# Patient Record
Sex: MALE | Race: WHITE | Employment: UNEMPLOYED | URBAN - METROPOLITAN AREA
[De-identification: names, ages, dates, MRNs, and addresses within clinical notes are randomized per-mention and may not be internally consistent; named-entity substitution may affect disease eponyms.]

---
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
## 2025-01-09 ENCOUNTER — OFFICE VISIT (OUTPATIENT)
Dept: FAMILY MEDICINE | Facility: CLINIC | Age: 20
End: 2025-01-09
Payer: COMMERCIAL

## 2025-01-09 VITALS
BODY MASS INDEX: 26.4 KG/M2 | DIASTOLIC BLOOD PRESSURE: 82 MMHG | HEART RATE: 73 BPM | WEIGHT: 188.6 LBS | RESPIRATION RATE: 18 BRPM | TEMPERATURE: 97.9 F | SYSTOLIC BLOOD PRESSURE: 122 MMHG | OXYGEN SATURATION: 100 % | HEIGHT: 71 IN

## 2025-01-09 DIAGNOSIS — Z00.00 ROUTINE GENERAL MEDICAL EXAMINATION AT A HEALTH CARE FACILITY: Primary | ICD-10-CM

## 2025-01-09 DIAGNOSIS — F98.8 ATTENTION DEFICIT DISORDER, UNSPECIFIED TYPE: ICD-10-CM

## 2025-01-09 DIAGNOSIS — Z13.1 SCREENING FOR DIABETES MELLITUS: ICD-10-CM

## 2025-01-09 DIAGNOSIS — Z13.220 SCREENING FOR HYPERLIPIDEMIA: ICD-10-CM

## 2025-01-09 LAB
BASOPHILS # BLD AUTO: 0.1 10E3/UL (ref 0–0.2)
BASOPHILS NFR BLD AUTO: 1 %
EOSINOPHIL # BLD AUTO: 0.2 10E3/UL (ref 0–0.7)
EOSINOPHIL NFR BLD AUTO: 2 %
ERYTHROCYTE [DISTWIDTH] IN BLOOD BY AUTOMATED COUNT: 12.5 % (ref 10–15)
EST. AVERAGE GLUCOSE BLD GHB EST-MCNC: 105 MG/DL
HBA1C MFR BLD: 5.3 % (ref 0–5.6)
HCT VFR BLD AUTO: 44.7 % (ref 40–53)
HGB BLD-MCNC: 14.9 G/DL (ref 13.3–17.7)
IMM GRANULOCYTES # BLD: 0 10E3/UL
IMM GRANULOCYTES NFR BLD: 0 %
LYMPHOCYTES # BLD AUTO: 1.9 10E3/UL (ref 0.8–5.3)
LYMPHOCYTES NFR BLD AUTO: 24 %
MCH RBC QN AUTO: 29.4 PG (ref 26.5–33)
MCHC RBC AUTO-ENTMCNC: 33.3 G/DL (ref 31.5–36.5)
MCV RBC AUTO: 88 FL (ref 78–100)
MONOCYTES # BLD AUTO: 0.8 10E3/UL (ref 0–1.3)
MONOCYTES NFR BLD AUTO: 10 %
NEUTROPHILS # BLD AUTO: 4.9 10E3/UL (ref 1.6–8.3)
NEUTROPHILS NFR BLD AUTO: 63 %
PLATELET # BLD AUTO: 212 10E3/UL (ref 150–450)
RBC # BLD AUTO: 5.06 10E6/UL (ref 4.4–5.9)
WBC # BLD AUTO: 7.9 10E3/UL (ref 4–11)

## 2025-01-09 RX ORDER — BUPROPION HYDROCHLORIDE 300 MG/1
300 TABLET ORAL EVERY MORNING
Qty: 90 TABLET | Refills: 0 | Status: SHIPPED | OUTPATIENT
Start: 2025-01-09

## 2025-01-09 SDOH — HEALTH STABILITY: PHYSICAL HEALTH: ON AVERAGE, HOW MANY DAYS PER WEEK DO YOU ENGAGE IN MODERATE TO STRENUOUS EXERCISE (LIKE A BRISK WALK)?: 1 DAY

## 2025-01-09 ASSESSMENT — SOCIAL DETERMINANTS OF HEALTH (SDOH): HOW OFTEN DO YOU GET TOGETHER WITH FRIENDS OR RELATIVES?: ONCE A WEEK

## 2025-01-09 NOTE — PROGRESS NOTES
Preventive Care Visit  [facility] FRANTZ Kamara MD, Internal Medicine  Jan 9, 2025      Assessment & Plan     Routine general medical examination at a health care facility  We discussed about diet/exercise  He had constipation issues in the past but he no longer has them  Discussed about vaccinations  He is up-to-date all the vaccines except meningitis B vaccine and COVID booster  Has a family history of diabetes  No family history of any colon cancer or prostate cancer  - Basic metabolic panel  (Ca, Cl, CO2, Creat, Gluc, K, Na, BUN); Future  - CBC with platelets and differential; Future  - Basic metabolic panel  (Ca, Cl, CO2, Creat, Gluc, K, Na, BUN)  - CBC with platelets and differential    Attention deficit disorder, unspecified type  He has issues with some ADD  He also has social anxiety issues  He does procrastinate a lot  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  He did have same procrastination issues in school  He does admit that he has issues with concentration focusing even when he switches of his electronics  He has been started on Wellbutrin  mg for his social anxiety and today they were wondering about treatment options for ADD  We discussed that the first thing will be to get him tested for ADD and if the formal testing does not show that he has got ADHD then we can look into treatment options with stimulants  For now I am happy to prescribe nonstimulants to see if that helps  Discussed about Strattera versus increasing the dose of Wellbutrin to 300 mg  They want to first increase the dose of Wellbutrin to 300 MG to see how it goes  We discussed about the side effects of Wellbutrin  - buPROPion (WELLBUTRIN XL) 300 MG 24 hr tablet; Take 1 tablet (300 mg) by mouth every morning.  - Adult Mental Health  Referral; Future    Screening for diabetes mellitus    - Hemoglobin A1c; Future  - Hemoglobin A1c    Screening for hyperlipidemia    - Lipid panel  "reflex to direct LDL Fasting; Future  - Lipid panel reflex to direct LDL Fasting    Patient has been advised of split billing requirements and indicates understanding: No        BMI  Estimated body mass index is 26.68 kg/m  as calculated from the following:    Height as of this encounter: 1.791 m (5' 10.5\").    Weight as of this encounter: 85.5 kg (188 lb 9.6 oz).   Weight management plan: Discussed healthy diet and exercise guidelines    Counseling  Appropriate preventive services were addressed with this patient via screening, questionnaire, or discussion as appropriate for fall prevention, nutrition, physical activity, Tobacco-use cessation, social engagement, weight loss and cognition.  Checklist reviewing preventive services available has been given to the patient.  Reviewed patient's diet, addressing concerns and/or questions.   He is at risk for lack of exercise and has been provided with information to increase physical activity for the benefit of his well-being.   He is at risk for psychosocial distress and has been provided with information to reduce risk.           Xavi Day is a 19 year old, presenting for the following:  Establish Care and Physical        1/9/2025     1:52 PM   Additional Questions   Roomed by Edd RIZZO   Accompanied by Elaine Powell (Mother)         1/9/2025     1:52 PM   Patient Reported Additional Medications   Patient reports taking the following new medications Wellbutrin          HPI  Here for annual physical        Health Care Directive  Patient does not have a Health Care Directive: Discussed advance care planning with patient; however, patient declined at this time.      1/9/2025   General Health   How would you rate your overall physical health? Good   Feel stress (tense, anxious, or unable to sleep) To some extent   (!) STRESS CONCERN      1/9/2025   Nutrition   Three or more servings of calcium each day? Yes   Diet: Regular (no restrictions)   How many servings of " fruit and vegetables per day? (!) 0-1   How many sweetened beverages each day? 0-1         1/9/2025   Exercise   Days per week of moderate/strenous exercise 1 day   (!) EXERCISE CONCERN      1/9/2025   Social Factors   Frequency of gathering with friends or relatives Once a week   Worry food won't last until get money to buy more No   Food not last or not have enough money for food? No   Do you have housing? (Housing is defined as stable permanent housing and does not include staying ouside in a car, in a tent, in an abandoned building, in an overnight shelter, or couch-surfing.) No   Are you worried about losing your housing? No   Lack of transportation? No   Unable to get utilities (heat,electricity)? No   Want help with housing or utility concern? No   (!) HOUSING CONCERN PRESENT      1/9/2025   Dental   Dentist two times every year? Yes         1/9/2025   TB Screening   Were you born outside of the [place]? No         Today's PHQ-2 Score:       1/9/2025     1:44 PM   PHQ-2 ( 1999 Pf[employer])   Q1: Little interest or pleasure in doing things 1   Q2: Feeling down, depressed or hopeless 1   PHQ-2 Score 2    Q1: Little interest or pleasure in doing things Several days   Q2: Feeling down, depressed or hopeless Several days   PHQ-2 Score 2       Patient-reported           1/9/2025   Substance Use   Alcohol more than 3/day or more than 7/wk No   Do you use any other substances recreationally? No     Social History     Tobacco Use    Smoking status: Never    Smokeless tobacco: Never             1/9/2025   One time HIV Screening   Previous HIV test? No         1/9/2025   STI Screening   New sexual partner(s) since last STI/HIV test? No         1/9/2025   Contraception/Family Planning   Questions about contraception or family planning No        Reviewed and updated as needed this visit by Provider                          Review of Systems  Constitutional, HEENT, cardiovascular, pulmonary, gi and gu systems are negative, except as  "otherwise noted.     Objective    Exam  /82 (BP Location: Left arm, Patient Position: Sitting, Cuff Size: Adult Large)   Pulse 73   Temp 97.9  F (36.6  C) (Temporal)   Resp 18   Ht 1.791 m (5' 10.5\")   Wt 85.5 kg (188 lb 9.6 oz)   SpO2 100%   BMI 26.68 kg/m     Estimated body mass index is 26.68 kg/m  as calculated from the following:    Height as of this encounter: 1.791 m (5' 10.5\").    Weight as of this encounter: 85.5 kg (188 lb 9.6 oz).    Physical Exam  GENERAL: alert and no distress  EYES: Eyes grossly normal to inspection, PERRL and conjunctivae and sclerae normal  HENT: ear canals and TM's normal, nose and mouth without ulcers or lesions  NECK: no adenopathy, no asymmetry, masses, or scars  RESP: lungs clear to auscultation - no rales, rhonchi or wheezes  CV: regular rate and rhythm, normal S1 S2, no S3 or S4, no murmur, click or rub, no peripheral edema  ABDOMEN: soft, nontender, no hepatosplenomegaly, no masses and bowel sounds normal  MS: no gross musculoskeletal defects noted, no edema  SKIN: no suspicious lesions or rashes  NEURO: Normal strength and tone, mentation intact and speech normal  PSYCH: mentation appears normal, affect normal/bright        Vision Screen       Hearing Screen           Signed Electronically by: Rajeev Kamara MD    "

## 2025-02-04 ENCOUNTER — MYC MEDICAL ADVICE (OUTPATIENT)
Dept: FAMILY MEDICINE | Facility: CLINIC | Age: 20
End: 2025-02-04
Payer: COMMERCIAL

## 2025-02-04 NOTE — TELEPHONE ENCOUNTER
This RN attempted to reach patient and mother (CtC on file 1/9/25) on 2/4/25.  Left message to return call.      If they call back:    Please triage for worsening anxiety.  See mychart message.      Last OV 1/9/25.  Provider increased Wellbutrin to 300 mg on that visit.  Did discuss alternatives during that visit of Strattera.  Mental Health  referral placed at that time too.         Kristina Kjellberg, MSN, RN

## 2025-02-04 NOTE — TELEPHONE ENCOUNTER
Patient returning clinic's call. Patient recently increased his Wellbutrin from 150 mg to 300 mg. Patient endorses worsening anxiety symptoms. Please see [employer] message.  Denies suicidal ideation. He reports his procrastination and concentration have not improved. Patient is scheduled for Psych in May.     Routing to provider. Patient wondering about other treatment options/ recommendations until testing in May.       Routing to provider to review/advise.     Patient would like a [employer] message back with provider's response.     Joan CHANN,  RN  [facility]

## 2025-02-10 ENCOUNTER — VIRTUAL VISIT (OUTPATIENT)
Dept: FAMILY MEDICINE | Facility: CLINIC | Age: 20
End: 2025-02-10
Payer: COMMERCIAL

## 2025-02-10 DIAGNOSIS — F41.1 GAD (GENERALIZED ANXIETY DISORDER): Primary | ICD-10-CM

## 2025-02-10 DIAGNOSIS — F33.0 MILD EPISODE OF RECURRENT MAJOR DEPRESSIVE DISORDER: ICD-10-CM

## 2025-02-10 DIAGNOSIS — F98.8 ATTENTION DEFICIT DISORDER, UNSPECIFIED TYPE: ICD-10-CM

## 2025-02-10 PROCEDURE — 98006 SYNCH AUDIO-VIDEO EST MOD 30: CPT | Performed by: INTERNAL MEDICINE

## 2025-02-10 RX ORDER — BUPROPION HYDROCHLORIDE 150 MG/1
300 TABLET ORAL EVERY MORNING
Status: SHIPPED
Start: 2025-02-10

## 2025-02-10 ASSESSMENT — ANXIETY QUESTIONNAIRES
8. IF YOU CHECKED OFF ANY PROBLEMS, HOW DIFFICULT HAVE THESE MADE IT FOR YOU TO DO YOUR WORK, TAKE CARE OF THINGS AT HOME, OR GET ALONG WITH OTHER PEOPLE?: VERY DIFFICULT
2. NOT BEING ABLE TO STOP OR CONTROL WORRYING: SEVERAL DAYS
4. TROUBLE RELAXING: MORE THAN HALF THE DAYS
GAD7 TOTAL SCORE: 7
7. FEELING AFRAID AS IF SOMETHING AWFUL MIGHT HAPPEN: NOT AT ALL
GAD7 TOTAL SCORE: 7
7. FEELING AFRAID AS IF SOMETHING AWFUL MIGHT HAPPEN: NOT AT ALL
5. BEING SO RESTLESS THAT IT IS HARD TO SIT STILL: NOT AT ALL
IF YOU CHECKED OFF ANY PROBLEMS ON THIS QUESTIONNAIRE, HOW DIFFICULT HAVE THESE PROBLEMS MADE IT FOR YOU TO DO YOUR WORK, TAKE CARE OF THINGS AT HOME, OR GET ALONG WITH OTHER PEOPLE: VERY DIFFICULT
GAD7 TOTAL SCORE: 7
3. WORRYING TOO MUCH ABOUT DIFFERENT THINGS: SEVERAL DAYS
6. BECOMING EASILY ANNOYED OR IRRITABLE: SEVERAL DAYS
1. FEELING NERVOUS, ANXIOUS, OR ON EDGE: MORE THAN HALF THE DAYS

## 2025-02-10 NOTE — PROGRESS NOTES
"  If patient has telephone visit, have they been educated on video visit as preferred visit method and offered to change to video visit? N/A        Instructions Relayed to Patient by Virtual Roomer:     Patient is active on [email or web address]:   Relayed following to patient: \"It looks like you are active on [email or web address], are you able to join the visit this way? If not, do you need us to send you a link now or would you like your provider to send a link via text or email when they are ready to initiate the visit?\"      Patient Confirmed they will join visit via: Email   Reminded patient to ensure they were logged on to virtual visit by arrival time listed.   Asked if patient has flexibility to initiate visit sooner than arrival time: patient is unable to initiate visit earlier than arrival time     If pediatric virtual visit, ensured pediatric patient along with parent/guardian will be present for video visit.     Patient offered the website [email or web address] and/or phone number to [email or web address] Help line: [phone]      Shay is a 20 year old who is being evaluated via a billable video visit.    How would you like to obtain your AVS? [employer]  If the video visit is dropped, the invitation should be resent by: Send to e-mail at: [email or web address]  Will anyone else be joining your video visit? No      Assessment & Plan     Attention deficit disorder, unspecified type  He has some ADHD symptoms  We discussed about this during last visit  He is going to see a psychologist in May for formal assessment  When I saw him last month he was on Wellbutrin  mg for his social anxiety disorder and we increase the dose to 300 mg as we thought this will help with his ADHD  However since increasing the dose he tells me that his anxiety is worse  His depression have also gotten worse  Today I will decrease the dose back to 150 mg XL  Alternate options will be explored to treat his anxiety and depression  - buPROPion " (WELLBUTRIN XL) 150 MG 24 hr tablet; Take 2 tablets (300 mg) by mouth every morning.    CE (generalized anxiety disorder)  CE score is 7 and he tells me his anxiety has gotten worse after increasing the dose of Wellbutrin XL to 300 mg  We discussed about SSRIs like Lexapro/Prozac/Zoloft/Cytomel plan  Also discussed about options like gabapentin and BuSpar  Also discussed about options which can be used as needed like hydroxyzine  He is going to look into these options  I explained to him that if  we select SSRI it will work for both anxiety and depression and we can slowly taper of the Wellbutrin XL unless he wants to continue to take this to counteract any sexual side effects of SSRIs  He is going to get back in touch with me about his choices    Mild episode of recurrent major depressive disorder  Between anxiety and depression his anxiety is worse  I told him that if we select SSRI that will also help with his depression  He is scheduled for counseling  No thoughts of self-harm                Xavi Day is a 20 year old, presenting for the following health issues:  Recheck Medication (Wellbutrin)      2/10/2025     1:02 PM   Additional Questions   Roomed by Asha OSBORN   Accompanied by Self     History of Present Illness       Mental Health Follow-up:  Patient presents to follow-up on Depression & Anxiety.Patient's depression since last visit has been:  Worse  The patient is having other symptoms associated with depression.  Patient's anxiety since last visit has been:  Worse  The patient is having other symptoms associated with anxiety.  Any significant life events: No  Patient is feeling anxious or having panic attacks.  Patient has no concerns about alcohol or drug use.    He eats 2-3 servings of fruits and vegetables daily.He consumes 1 sweetened beverage(s) daily.He exercises with enough effort to increase his heart rate 9 or less minutes per day.  He exercises with enough effort to increase his heart  rate 3 or less days per week.   He is taking medications regularly.               Review of Systems  Constitutional, HEENT, cardiovascular, pulmonary, gi and gu systems are negative, except as otherwise noted.      Objective           Vitals:  No vitals were obtained today due to virtual visit.    Physical Exam   GENERAL: alert and no distress  EYES: Eyes grossly normal to inspection.  No discharge or erythema, or obvious scleral/conjunctival abnormalities.  RESP: No audible wheeze, cough, or visible cyanosis.    SKIN: Visible skin clear. No significant rash, abnormal pigmentation or lesions.  NEURO: Cranial nerves grossly intact.  Mentation and speech appropriate for age.  PSYCH: Appropriate affect, tone, and pace of words          Video-Visit Details    Type of service:  Video Visit   Originating Location (pt. Location): Home    Distant Location (provider location):  Off-site  Platform used for Video Visit: [facility]  Signed Electronically by: Rajeev Kamara MD

## 2025-02-16 ENCOUNTER — MYC MEDICAL ADVICE (OUTPATIENT)
Dept: FAMILY MEDICINE | Facility: CLINIC | Age: 20
End: 2025-02-16
Payer: COMMERCIAL

## 2025-02-16 DIAGNOSIS — F33.0 MILD EPISODE OF RECURRENT MAJOR DEPRESSIVE DISORDER: ICD-10-CM

## 2025-02-16 DIAGNOSIS — F41.1 GAD (GENERALIZED ANXIETY DISORDER): Primary | ICD-10-CM

## 2025-02-17 RX ORDER — ESCITALOPRAM OXALATE 10 MG/1
10 TABLET ORAL DAILY
Qty: 90 TABLET | Refills: 0 | Status: SHIPPED | OUTPATIENT
Start: 2025-02-17

## 2025-02-17 RX ORDER — HYDROXYZINE HYDROCHLORIDE 25 MG/1
25-50 TABLET, FILM COATED ORAL 3 TIMES DAILY PRN
Qty: 270 TABLET | Refills: 0 | Status: SHIPPED | OUTPATIENT
Start: 2025-02-17

## 2025-03-07 ENCOUNTER — MYC REFILL (OUTPATIENT)
Dept: FAMILY MEDICINE | Facility: CLINIC | Age: 20
End: 2025-03-07
Payer: COMMERCIAL

## 2025-03-07 DIAGNOSIS — F98.8 ATTENTION DEFICIT DISORDER, UNSPECIFIED TYPE: ICD-10-CM

## 2025-03-07 NOTE — TELEPHONE ENCOUNTER
Clinic RN: Please investigate patient's chart or contact patient if the information cannot be found because  please pend desired pharmacy. Last script put in as no print out, please route to provider for review.     DUSTIN BergmanN, RN

## 2025-03-07 NOTE — TELEPHONE ENCOUNTER
Pharmacy pended, asking if patient is on 150 mg or 300 mg daily. Per MyC 2/16, patient may only be taking 150 mg daily.    Selina Cuevas, RN, BSN  [facility]  [place], [place] and [place]

## 2025-03-10 RX ORDER — BUPROPION HYDROCHLORIDE 150 MG/1
150 TABLET ORAL EVERY MORNING
Qty: 90 TABLET | Refills: 1 | Status: SHIPPED | OUTPATIENT
Start: 2025-03-10

## 2025-03-10 NOTE — TELEPHONE ENCOUNTER
Routing to provider to advise on refill request - patient only take 150 mg daily, dose pended      Selina Cuevas, RN, BSN  [facility]  [place], [place] and [place]

## 2025-04-15 ENCOUNTER — MYC MEDICAL ADVICE (OUTPATIENT)
Dept: FAMILY MEDICINE | Facility: CLINIC | Age: 20
End: 2025-04-15
Payer: COMMERCIAL

## 2025-04-15 DIAGNOSIS — R53.83 TIRED: Primary | ICD-10-CM

## 2025-05-12 ENCOUNTER — VIRTUAL VISIT (OUTPATIENT)
Facility: CLINIC | Age: 20
End: 2025-05-12
Attending: INTERNAL MEDICINE
Payer: COMMERCIAL

## 2025-05-12 DIAGNOSIS — Z13.39 ADHD (ATTENTION DEFICIT HYPERACTIVITY DISORDER) EVALUATION: ICD-10-CM

## 2025-05-12 DIAGNOSIS — F41.1 GENERALIZED ANXIETY DISORDER: Primary | ICD-10-CM

## 2025-05-12 DIAGNOSIS — F32.0 MAJOR DEPRESSIVE DISORDER, SINGLE EPISODE, MILD: ICD-10-CM

## 2025-05-12 PROCEDURE — 90832 PSYTX W PT 30 MINUTES: CPT | Mod: 95

## 2025-05-12 ASSESSMENT — ANXIETY QUESTIONNAIRES
GAD7 TOTAL SCORE: 8
3. WORRYING TOO MUCH ABOUT DIFFERENT THINGS: SEVERAL DAYS
GAD7 TOTAL SCORE: 8
2. NOT BEING ABLE TO STOP OR CONTROL WORRYING: SEVERAL DAYS
5. BEING SO RESTLESS THAT IT IS HARD TO SIT STILL: SEVERAL DAYS
7. FEELING AFRAID AS IF SOMETHING AWFUL MIGHT HAPPEN: NOT AT ALL
GAD7 TOTAL SCORE: 8
4. TROUBLE RELAXING: MORE THAN HALF THE DAYS
8. IF YOU CHECKED OFF ANY PROBLEMS, HOW DIFFICULT HAVE THESE MADE IT FOR YOU TO DO YOUR WORK, TAKE CARE OF THINGS AT HOME, OR GET ALONG WITH OTHER PEOPLE?: SOMEWHAT DIFFICULT
6. BECOMING EASILY ANNOYED OR IRRITABLE: SEVERAL DAYS
7. FEELING AFRAID AS IF SOMETHING AWFUL MIGHT HAPPEN: NOT AT ALL
1. FEELING NERVOUS, ANXIOUS, OR ON EDGE: MORE THAN HALF THE DAYS
IF YOU CHECKED OFF ANY PROBLEMS ON THIS QUESTIONNAIRE, HOW DIFFICULT HAVE THESE PROBLEMS MADE IT FOR YOU TO DO YOUR WORK, TAKE CARE OF THINGS AT HOME, OR GET ALONG WITH OTHER PEOPLE: SOMEWHAT DIFFICULT

## 2025-05-12 ASSESSMENT — PATIENT HEALTH QUESTIONNAIRE - PHQ9
SUM OF ALL RESPONSES TO PHQ QUESTIONS 1-9: 9
10. IF YOU CHECKED OFF ANY PROBLEMS, HOW DIFFICULT HAVE THESE PROBLEMS MADE IT FOR YOU TO DO YOUR WORK, TAKE CARE OF THINGS AT HOME, OR GET ALONG WITH OTHER PEOPLE: SOMEWHAT DIFFICULT
SUM OF ALL RESPONSES TO PHQ QUESTIONS 1-9: 9

## 2025-05-12 NOTE — PROGRESS NOTES
[facility]   Mental Health & Addiction Services     Progress Note - Initial Visit    Client Name:  Shay Early Date: May 12, 2025         Service Type: Individual     Visit Start Time: 2:30 pm  Visit End Time: 3:04 pm    Visit #: 1    Attendees: Client attended alone    Service Modality:  Video Visit:      Provider verified identity through the following two step process.  Patient provided:  Patient  and Patient address    Telemedicine Visit: The patient's condition can be safely assessed and treated via synchronous audio and visual telemedicine encounter.      Reason for Telemedicine Visit: Patient convenience (e.g. access to timely appointments / distance to available provider)    Originating Site (Patient Location): Patient's home    Distant Site (Provider Location): Provider Remote Setting- Home Office    Consent:  The patient/guardian has verbally consented to: the potential risks and benefits of telemedicine (video visit) versus in person care; bill my insurance or make self-payment for services provided; and responsibility for payment of non-covered services.     Patient would like the video invitation sent by:  My Chart    Mode of Communication:  Video Conference via [facility]    Distant Location (Provider):  Off-site    As the provider I attest to compliance with applicable laws and regulations related to telemedicine.       DATA:  Extended Session (53+ minutes): No  Interactive Complexity: No   Crisis: No     Presenting Concerns/Current Stressors:   Patient presented to session to initiate the ADHD evaluation process.           2025     1:50 PM   PHQ   PHQ-9 Total Score 9    Q9: Thoughts of better off dead/self-harm past 2 weeks Not at all       Patient-reported            2/10/2025     1:02 PM 2025     2:07 PM   CE-7 SCORE   Total Score 7 (mild anxiety) 8 (mild anxiety)   Total Score 7  8        Patient-reported    Proxy-reported        ASSESSMENT:  Mental Status Assessment:  Appearance:   Appropriate   Eye Contact:   Good   Psychomotor Behavior: Normal   Attitude:   Cooperative   Orientation:   All  Speech   Rate / Production: Normal/ Responsive   Volume:  Normal   Mood:    Stable  Affect:    Appropriate   Thought Content:  Clear   Thought Form:  Coherent   Insight:    Good       Safety Issues and Plan for Safety and Risk Management:   [place] Suicide Severity Rating Scale (Lifetime/Recent)      5/12/2025     2:44 PM   [place] Suicide Severity Rating (Lifetime/Recent)   1. Wish to be Dead (Lifetime) N   1. Wish to be Dead (Past 1 Month) N   2. Non-Specific Active Suicidal Thoughts (Lifetime) N   Has subject engaged in non-suicidal self-injurious behavior? (Lifetime) N   Calculated C-SSRS Risk Score (Lifetime/Recent) No Risk Indicated     Patient denies current fears or concerns for personal safety.  Patient denies current or recent suicidal ideation or behaviors.  Patient denies current or recent homicidal ideation or behaviors.  Patient denies current or recent self injurious behavior or ideation.  Patient denies other safety concerns.  Recommended that patient call 911 or go to the local ED should there be a change in any of these risk factors   Patient reports there are no firearms in the house.    Diagnostic Criteria:  A. Five (or more) symptoms have been present during the same 2-week period and represent a change from previous functioning; at least one of the symptoms is either (1) depressed mood or (2) loss of interest or pleasure.   Depressed mood.   Diminished interest or pleasure in all, or almost all, activities.   Significant appetite change.  Significant sleep change.   Fatigue or loss of energy.   Feelings of worthlessness or inappropriate guilt.   Diminished ability to think or concentrate, or indecisiveness.   Psychomotor agitation or lethargy.   Recurrent thoughts of death.   B. The symptoms cause clinically significant  distress or impairment in social, occupational, or other important areas of functioning.  C. The episode is not attributable to the physiological effects of a substance or to another medical condition.  D. The occurence of major depressive episode is not better explained by other thought / psychotic disorders.  E. There has never been a manic episode or hypomanic episode.      A. Excessive anxiety and worry, occurring more days than not for at least 6 months about a number of events or activities.   B. The individual finds it difficult to control the worry.  C. The anxiety and worry are associated with 3 or more of 6 symptoms.  D. The anxiety, worry, or physical symptoms cause clinically significant distress or impairment in social, occupational, or other important areas of functioning.  E. The disturbance is not attributable to the physiological effects of a substance (e.g., a drug of abuse, a medication) or another medical condition (e.g., hyperthyroidism).  F. The disturbance is not better explained by another mental disorder (e.g., anxiety or worry about having panic attacks in panic disorder, negative evaluation in social anxiety disorder [social phobia], contamination or other obsessions in obsessive-compulsive disorder, separation from attachment figures in separation anxiety disorder, reminders of traumatic events in posttraumatic stress disorder, gaining weight in anorexia nervosa, physical complaints in somatic symptom disorder, perceived appearance flaws in body dysmorphic disorder, having a serious illness in illness anxiety disorder, or the content of delusional beliefs in schizophrenia or delusional disorder).       DSM5 Diagnoses: (Sustained by DSM5 Criteria Listed Above)  Diagnoses:   1. Generalized anxiety disorder    2. Major depressive disorder, single episode, mild    3. ADHD (attention deficit hyperactivity disorder) evaluation      Psychosocial & Contextual Factors: college student, previous MH  hx    PROMIS-10 Scores  Global Mental Health Score: (Patient-Rptd) (P) 11  Global Physical Health Score: (Patient-Rptd) (P) 16   PROMIS TOTAL - SUBSCORES: (Patient-Rptd) (P) 27      Intervention:              Reviewed symptoms and history of presenting concern. Patient endorsed symptoms consistent with depression , anxiety , panic, and ADHD. Patient denied symptoms associated with reno, OCD, trauma, Autism, perceptual difficulties, and disordered eating. Unable to complete diagnostic intake, will be completed in next session. Offered suggestions about behavioral changes to assist with concentration.    CBT: socratic questioning, positive reinforcement  EFT: empathetic attunement, emotion checking, emotion naming  MI: open ended questions, affirmations, reflections        Attendance Agreement:  Client has not signed the attendance agreement. Discussed expectations at beginning of this first session and patient agreed.       PLAN:  Provider will continue Diagnostic Assessment in next session. Patient will complete Laura questionnaires  prior to next session (5/19/2025).    Patient meets the following risk assessment and triage: Patient denied any current/recent/lifetime history of suicidal ideation and/or behaviors.  No safety plan indicated at this time.     Medical necessity criteria is warranted in order to: Measure psychological barriers and strengths to aid in treatment planning, including but not limited to the selection of treatment options when several different approaches may be indicated, to determine treatment prognosis and outcomes, or to identify reasons for poor response to treatment, Perform symptom measurement to objectively measure treatment effectiveness and/or determine the need to refer for pharmacological treatment or other medical evaluation (e.g., based on severity and chronicity of symptoms), and Evaluate primary symptoms of impaired attention and concentration that can occur in many  neurological and psychiatric conditions.    Medical necessity for psychological assessment is warranted as a result of the following: (1) A specific clinical question is posed that relates to the condition/symptoms being addressed (2) The question cannot be adequately addressed by clinical interview and/or behavioral observation (3) Results of psychological testing are reasonably expected to provide an answer to the query (4) It is reasonably expected that the testing will provide information leading to a clearer diagnosis and/or guide treatment planning with an expectation of improved clinical outcome.    I acknowledge that, based upon current clinical information, the patient and I have reviewed and discussed issues pertaining to the purpose of therapy/testing, potential therapeutic goals, procedures, risks and benefits, and estimated duration of therapy/testing. Issues pertaining to fees/insurance and confidentiality were also addressed with the patient, who indicated understanding and elected to continue with appointments. I will not be providing any experimental procedures and, if we agree that a change in clinical procedure would be more beneficial, I will obtain specific consent for that procedure or refer you to another provider who has expertise in that area.       Jeanie Doty MA  Doctoral Psychology Intern  5/12/2025 at 3:04 PM

## 2025-05-19 ENCOUNTER — VIRTUAL VISIT (OUTPATIENT)
Facility: CLINIC | Age: 20
End: 2025-05-19
Payer: COMMERCIAL

## 2025-05-19 DIAGNOSIS — Z13.39 ADHD (ATTENTION DEFICIT HYPERACTIVITY DISORDER) EVALUATION: ICD-10-CM

## 2025-05-19 DIAGNOSIS — F32.0 MAJOR DEPRESSIVE DISORDER, SINGLE EPISODE, MILD: ICD-10-CM

## 2025-05-19 DIAGNOSIS — F41.1 GENERALIZED ANXIETY DISORDER: Primary | ICD-10-CM

## 2025-05-19 PROCEDURE — 90791 PSYCH DIAGNOSTIC EVALUATION: CPT | Mod: 95

## 2025-05-19 NOTE — PROGRESS NOTES
[facility]  Provider Name:  Jeanie Doty    Credentials:  MA, Doctoral Psychology Intern    PATIENT'S NAME: Shay Early  PREFERRED NAME: Shay  PRONOUNS: he/him  MRN: [id number]  : 2005  ADDRESS: [address]  [facility] [address]  ACCT. NUMBER:  [id number]  DATE OF SERVICE: 25  START TIME: 12:30 pm  END TIME: 1:01 pm  PREFERRED PHONE: [phone]  SERVICE MODALITY:  Video Visit:      Provider verified identity through the following two step process.  Patient provided:  Patient  and Patient address    Telemedicine Visit: The patient's condition can be safely assessed and treated via synchronous audio and visual telemedicine encounter.      Reason for Telemedicine Visit: Patient convenience (e.g. access to timely appointments / distance to available provider)    Originating Site (Patient Location): Patient's home    Distant Site (Provider Location): Provider Remote Setting- Home Office    Consent:  The patient/guardian has verbally consented to: the potential risks and benefits of telemedicine (video visit) versus in person care; bill my insurance or make self-payment for services provided; and responsibility for payment of non-covered services.     Patient would like the video invitation sent by:  My Chart    Mode of Communication:  Video Conference via Amwell    Distant Location (Provider):  Off-site    As the provider I attest to compliance with applicable laws and regulations related to telemedicine.    UNIVERSAL ADULT Mental Health DIAGNOSTIC ASSESSMENT    Identifying Information:  Patient is a 20 year old,  male. The pronoun use throughout this assessment reflects the patient's chosen pronoun. Patient was referred for an assessment by Rajeev Kamara MD. Patient attended the session alone.     Chief Complaint:   Patient reported seeking services at this time for diagnostic assessment and recommendations for treatment. Patient's presenting  concerns include: symptoms of ADHD. Specifically, the patient reported experiencing the following symptoms: making careless mistakes/problems attending to details, difficulty sustaining attention, problems listening when spoken to directly, not following through with tasks, difficulty organizing, avoiding tasks that require sustained mental effort, losing things often, being easily distracted, being forgetful, is fidgety, and internal sense of feeling overwhelmed .     Patient reported that he has not been assessed for ADHD in the past. Symptoms reportedly began in middle school but worsened when he started college. Client reported that other professional(s) are not involved in providing services, as he was referred by his PCP.    Social/Family History:  Patient reported they grew up in [place]  .  They were raised by biological parents; biological mother; biological father; grandmother; grandfather; aunt  .  Parents  / .  Patient was 3 yrs old when parents . Patient reported that their childhood was stable. Patient reports a little drama between parents, but as he got older it got easier. School was difficult at first and then got easier. Patient has a twin brother with ASD and indicates relationship at first with him was karla. Patient reports a decent relationship with mother with occasional conflict regarding the patient going back to work. Patient reports a decent relationship with his father.     Patient described his childhood family environment as nurturing and stable.  As a child, patient reported that he failed to complete assigned chores in the home environment, had problems with organization and keeping track of items, misplaced or lost things, forgot school work or other items between home and school, needed frequent reminders by parents to be motivated or to complete work, displayed argumentative or oppositional behaviors, and had problems managing temper with frequent  emotional outbursts. Patient reported difficulty with childhood peer relationships. Patient reports pushing friends away when he was younger.     The patient describes their cultural background as .  Cultural influences and impact on patient's life structure, values, norms, and healthcare: Grew up in the [place], family is partly Mormonism, twin brother is on the autism spectrum and I was apart of the special needs program in elementary/middle school. Went to [employer] for middle/high school which specialized in having a teaching environment for students with mental disabilities. These factors will be addressed in the Preliminary Treatment plan.  Patient identified their preferred language to be English. Patient reported they does not need the assistance of an  or other support involved in therapy.     Patient reported experienced significant delays in developmental tasks, such as being born a month premature.   Patient's highest education level was high school graduate  . Patient is currently in college for environmental science. Patient identified the following learning problems: attention, concentration, and writing and math. Patient reports writing skills still not the greatest.  Modifications will not be used to assist communication in therapy.  Patient reports they are able to understand written materials.    Patient did not receive tutoring services during the school years. Patient did receive special education services. Patient reports being on an IEP from early middle school-early high school. Patient  reported significant behavior and discipline problems including: disruptive classroom behavior and failure to finish or complete homework. Patient did attend post secondary school.     Patient's current relationship status is single for whole life.   Patient identified their sexual orientation as heterosexual.  Patient reported having  0 child(kirt). Patient identified parents as part  of their support system.  Patient identified the quality of these relationships as good,  .      Patient's current living/housing situation involves staying in own home/apartment. The immediate members of family and household include Elaine Early, 51,Mother and they report that housing is stable    Patient is currently student.  Patient reports their finances are obtained through parents; family. 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  Client has not been terminated from a place of employment.  Patient does identify finances as a current stressor.      Patient reported that theyhave not been involved with the legal system.  Patient does not being under probation/ parole/ jurisdiction. They are not under any current court jurisdiction. .    Patient has received a 's license.  Patient has not received any moving violations.  Patient reported the following driving habits: attentive and cautious and experiences road rage.  According to client, other people are comfortable riding as passengers when he is driving.     Patient's Strengths and Limitations:  Patient identified the following strengths or resources that will help them succeed in treatment: family support, insight, intelligence, and motivation. Things that may interfere with the patient's success in treatment include: none identified.     Personal and Family Medical History:  Patient does report a family history of mental health concerns.  Patient reports family history is not on file.. Patient reports father as having ASD and anxiety, grandfather has had parkinson's disease and Dementia.     Patient does report Mental Health Diagnosis and/or Treatment.  Patient reported the following previous diagnoses which include(s): an anxiety disorder .  Patient reported symptoms began at age 11 or 12.   Patient has received mental health services in the past:  therapy  .  Psychiatric Hospitalizations: none Patient denies a history of civil commitment.      Currently, patient none  is receiving other mental health services.      Patient has not had a physical exam to rule out medical causes for current symptoms.  Date of last physical exam was greater than a year ago and client was encouraged to schedule an exam with PCP. The patient has a [place] [facility] Provider, who is named [facility].  Patient reports no current medical concerns.  Patient denies any issues with pain..   There are not significant appetite / nutritional concerns / weight changes.   Patient does not report a history of head injury / trauma / cognitive impairment.      Patient reports current meds as:   Current Outpatient Medications   Medication Sig Dispense Refill    buPROPion (WELLBUTRIN XL) 150 MG 24 hr tablet Take 1 tablet (150 mg) by mouth every morning. 90 tablet 1    escitalopram (LEXAPRO) 10 MG tablet Take 1 tablet (10 mg) by mouth daily. 90 tablet 0    hydrOXYzine HCl (ATARAX) 25 MG tablet Take 1-2 tablets (25-50 mg) by mouth 3 times daily as needed for anxiety. 270 tablet 0     No current facility-administered medications for this visit.       Medication Adherence:  Patient reports taking.    Patient Allergies:  No Known Allergies    Medical History: Patient reports no previous significant medical history.      Current Mental Status Exam:   Appearance:  Appropriate    Eye Contact:  Good   Psychomotor:  Normal       Gait / station:  no problem  Attitude / Demeanor: Cooperative   Speech      Rate / Production: Normal/ Responsive      Volume:  Normal  volume      Language:  intact, no problems, and good  Mood:   Normal  Affect:   Appropriate    Thought Content: Clear   Thought Process: Coherent       Associations: No loosening of associations  Insight:   Good   Judgment:  Intact    Orientation:  All  Attention/concentration: Good    Rating Scales:      5/12/2025     1:50 PM   PHQ   PHQ-9 Total Score 9    Q9: Thoughts of better off dead/self-harm past 2 weeks Not at all       Patient-reported          2/10/2025     1:02 PM 5/12/2025     2:07 PM   CE-7 SCORE   Total Score 7 (mild anxiety) 8 (mild anxiety)   Total Score 7  8        Patient-reported    Proxy-reported        Substance Use:  Patient did not report a family history of substance use concerns; see medical history section for details. Patient has not received chemical dependency treatment in the past. Patient has not ever been to detox. Patient is not currently receiving any chemical dependency treatment. Patient reported no significant problems as a result of his substance use.    Alcohol: rarely, reports being a social drinker.  Nicotine: none reported.  Cannabis: occasional use, couple times out of the month.  Caffeine: daily use (coffee/soda/tea).  Street Drugs: none reported.  Prescription Drugs: none reported.        5/12/2025     2:08 PM   CAGE-AID Total Score   Total Score 1    Total Score MyChart 1 (A total score of 2 or greater is considered clinically significant)       Patient-reported       CAGE-AID score  > 1 is a positive screen, suggesting further discussion is needed to determine if evaluation for alcohol or substance abuse is appropriate.  A score > 2 is considered clinically significant, suggesting further evaluation of alcohol or substance-related problems is indicated.       Substance Use: No symptoms    Based on the negative CAGE score and clinical interview there are not indications of drug or alcohol abuse.    Significant Losses / Trauma / Abuse / Neglect Issues:   Patient   has not serve in the .  There are indications or report of significant loss, trauma, abuse or neglect issues related to: death of grandfather on dad's side.  Concerns for possible neglect are not present.     Safety Assessment:    Patient denies current homicidal ideation and behaviors.  Patient denies current self-injurious ideation and behaviors.    Patient denied risk behaviors associated with substance use.   Patient denies any high risk behaviors associated with mental health symptoms.  Patient reports the following current concerns for their personal safety: None.  Patient reports there are no firearms in the home..    History of Safety Concerns:  Benton Suicide Severity Rating Scale (Lifetime/Recent)Benton Suicide Severity Rating Scale (Lifetime/Recent)      5/12/2025     2:44 PM   Benton Suicide Severity Rating (Lifetime/Recent)   1. Wish to be Dead (Lifetime) N   1. Wish to be Dead (Past 1 Month) N   2. Non-Specific Active Suicidal Thoughts (Lifetime) N   Has subject engaged in non-suicidal self-injurious behavior? (Lifetime) N   Calculated C-SSRS Risk Score (Lifetime/Recent) No Risk Indicated     Patient denied a history of homicidal ideation.     Patient denied a history of personal safety concerns.    Patient denied a history of assaultive behaviors.    Patient denied a history of sexual assault behaviors.     Patient denied a history of risk behaviors associated with substance use.  Patient denies any history of high risk behaviors associated with mental health symptoms.  Patient reports the following protective factors:      Risk Plan:  See Recommendations for Safety and Risk Management Plan    Review of Symptoms per patient report:   Depression: Lack of interest or pleasure in doing things, Feeling sad, down, or depressed, Change in sleep, Low self-worth, Difficulties concentrating, and Psychomotor slowing or agitation  Shaina:  No Symptoms  Psychosis: No Symptoms  Anxiety: Excessive worry, Nervousness, Sleep disturbance, Ruminations, Poor concentration, and Irritability  Panic:  Palpitations and Shortness of breath - last panic attack was a couple of months ago  Post Traumatic Stress Disorder:  No Symptoms   Eating  Disorder: No Symptoms  ADD / ADHD:  Inattentive, Difficulties listening, Poor task completion, Poor organizational skills, Distractibility, Forgetful, and Restlessness/fidgety  Conduct Disorder: No symptoms  Autism Spectrum Disorder: No symptoms  Obsessive Compulsive Disorder: No Symptoms  Personality Disorders:  n/a    Patient reports the following compulsive behaviors and treatment history: None.        Diagnostic Criteria:   A. Excessive anxiety and worry, occurring more days than not for at least 6 months about a number of events or activities.   B. The individual finds it difficult to control the worry.  C. The anxiety and worry are associated with 3 or more of 6 symptoms.  D. The anxiety, worry, or physical symptoms cause clinically significant distress or impairment in social, occupational, or other important areas of functioning.  E. The disturbance is not attributable to the physiological effects of a substance (e.g., a drug of abuse, a medication) or another medical condition (e.g., hyperthyroidism).  F. The disturbance is not better explained by another mental disorder (e.g., anxiety or worry about having panic attacks in panic disorder, negative evaluation in social anxiety disorder [social phobia], contamination or other obsessions in obsessive-compulsive disorder, separation from attachment figures in separation anxiety disorder, reminders of traumatic events in posttraumatic stress disorder, gaining weight in anorexia nervosa, physical complaints in somatic symptom disorder, perceived appearance flaws in body dysmorphic disorder, having a serious illness in illness anxiety disorder, or the content of delusional beliefs in schizophrenia or delusional disorder).     A. Five (or more) symptoms have been present during the same 2-week period and represent a change from previous functioning; at least one of the symptoms is either (1) depressed mood or (2) loss of interest or pleasure.   Depressed mood.    Diminished interest or pleasure in all, or almost all, activities.   Significant appetite change.  Significant sleep change.   Fatigue or loss of energy.   Feelings of worthlessness or inappropriate guilt.   Diminished ability to think or concentrate, or indecisiveness.   Psychomotor agitation or lethargy.   Recurrent thoughts of death.   B. The symptoms cause clinically significant distress or impairment in social, occupational, or other important areas of functioning.  C. The episode is not attributable to the physiological effects of a substance or to another medical condition.  D. The occurence of major depressive episode is not better explained by other thought / psychotic disorders.  E. There has never been a manic episode or hypomanic episode.      Functional Status:  Patient reports the following functional impairments: management of the household and or completion of tasks and organization.       PROMIS-10:   Global Mental Health Score: (Patient-Rptd) (P) 11  Global Physical Health Score: (Patient-Rptd) (P) 16   PROMIS TOTAL - SUBSCORES: (Patient-Rptd) (P) 27   Nonprogrammatic care: Patient is requesting basic services to address current mental health concerns.    Clinical Summary:  1. Reason for assessment: assessing reported deficits in executive functioning (rule in/out ADHD).  2. Psychosocial, cultural and contextual factors: college student, previous MH hx   .  3. Principal DSM-5 diagnoses (Sustained by DSM5 Criteria Listed Above) and other diagnoses relevant to this service:   1. Generalized anxiety disorder    2. Major depressive disorder, single episode, mild    3. ADHD (attention deficit hyperactivity disorder) evaluation      4. Prognosis: Relieve Acute Symptoms.  5. Likely consequences of symptoms if not treated: continued distress.  6. Client strengths include: has a previous history of therapy, insightful, intelligent, and support of family, friends and providers .     Recommendations:   Per  medical necessity criteria for psychological testing, patient will complete Laura questionnaires and MMPI-3 before feedback session is scheduled. The patient was made aware that the link expires after 30 days and if the test is not completed within that timeframe, it will be his responsibility to reinitiate contact to resume the testing process.  My contact information was provided.  Patient was in agreement to this plan.    1. Plan for Safety and Risk Management:  Safety and Risk: Recommended that patient call 911 or go to the local ED should there be a change in any of these risk factors.         Report to child / adult protection services was NA.     2. Patient's identified mental health concerns with a cultural influence will be addressed in final recommendations.     3. Initial Treatment will focus on: ADHD testing. See above.      4. Resources/Service Plan:    services are not indicated.   Modifications to assist communication are not indicated.   Additional disability accommodations are not indicated.      5. Collaboration:   Collaboration / coordination of treatment will be initiated with the following  support professionals: n/a.      6.  Referrals:   The following referral(s) will be initiated: n/a. Next Scheduled Appointment: 6/26/2025.    A Release of Information has been obtained for the following: n/a.   Emergency Contact was not obtained.    Clinical Substantiation/medical necessity for the above recommendations:     Medical necessity criteria is warranted in order to: Measure psychological barriers and strengths to aid in treatment planning, including but not limited to the selection of treatment options when several different approaches may be indicated, to determine treatment prognosis and outcomes, or to identify reasons for poor response to treatment, Perform symptom measurement to objectively measure treatment effectiveness and/or determine the need to refer for pharmacological  treatment or other medical evaluation (e.g., based on severity and chronicity of symptoms), and Evaluate primary symptoms of impaired attention and concentration that can occur in many neurological and psychiatric conditions.    Medical necessity for psychological assessment is warranted as a result of the following: (1) A specific clinical question is posed that relates to the condition/symptoms being addressed (2) The question cannot be adequately addressed by clinical interview and/or behavioral observation (3) Results of psychological testing are reasonably expected to provide an answer to the query (4) It is reasonably expected that the testing will provide information leading to a clearer diagnosis and/or guide treatment planning with an expectation of improved clinical outcome.    7. JEANETH: n/a    8. Records:   These were reviewed at time of assessment.   Information in this assessment was obtained from the medical record and  provided by patient who is a good historian. Patient will have open access to their mental health medical record.    9. Interactive Complexity: No     Parts of this documentation may have been completed using dictation software. Potential errors may result and are unintentional.       Jeanie Doty MA  Doctoral Psychology Intern  May 12, 2025

## 2025-05-27 DIAGNOSIS — F41.1 GAD (GENERALIZED ANXIETY DISORDER): ICD-10-CM

## 2025-05-27 DIAGNOSIS — F33.0 MILD EPISODE OF RECURRENT MAJOR DEPRESSIVE DISORDER: ICD-10-CM

## 2025-05-28 RX ORDER — ESCITALOPRAM OXALATE 10 MG/1
10 TABLET ORAL DAILY
Qty: 90 TABLET | Refills: 3 | Status: SHIPPED | OUTPATIENT
Start: 2025-05-28

## 2025-06-09 ENCOUNTER — PATIENT OUTREACH (OUTPATIENT)
Dept: CARE COORDINATION | Facility: CLINIC | Age: 20
End: 2025-06-09
Payer: COMMERCIAL

## 2025-06-10 ENCOUNTER — NURSE TRIAGE (OUTPATIENT)
Dept: FAMILY MEDICINE | Facility: CLINIC | Age: 20
End: 2025-06-10
Payer: COMMERCIAL

## 2025-06-10 NOTE — TELEPHONE ENCOUNTER
"Patient's mother who is a nurse is calling.    Patient developed hives all over body yesterday morning, no known cause. T 99.9.    Patient was seen at [facility] yesterday and was prescribed prednisone.    Started prednisone today. Also taking Benadryl.    Some hives are reducing.  New symptom this morning before starting prednisone: swollen lips. Lips are reducing in size with Benadryl.    Needs to be seen again with new symptoms. Mom  does not want to go to  or ER.    Mom requesting an appointment tomorrow with a provider preferable with Dr. Kamara.    OV scheduled with Dr. Kamara tomorrow.    Additional Information   Negative: Difficulty breathing or wheezing now   Negative: Rapid onset of swollen tongue   Negative: Rapid onset of hoarseness or cough   Negative: Very weak (can't stand)   Negative: Difficult to awaken or acting confused (e.g., disoriented, slurred speech)   Negative: Life-threatening reaction (anaphylaxis) in the past to similar substance (e.g., food, insect bite/sting, chemical, etc.) and < 2 hours since exposure   Negative: Sounds like a life-threatening emergency to the triager   Negative: Bee, wasp, or yellow jacket sting within last 24 hours   Negative: Taking a new medicine now or within last 3 days  (Exception: Antihistamine, decongestant or other OTC cough/cold medicines.)   Negative: Doesn't match the SYMPTOMS of hives    Answer Assessment - Initial Assessment Questions  1. APPEARANCE: \"What does the rash look like?\"       Hives all over, seen at [facility] yesterday, started steroid today  2. LOCATION: \"Where is the rash located?\"       The whole body  3. NUMBER: \"How many hives are there?\"       A lot  4. SIZE: \"How big are the hives?\" (e.g., inches, cm, compare to coins) \"Do they all look the same or do they vary in shape and size?\"       Siver dollar on body trunk, forehead delma size  5. ONSET: \"When did the hives begin?\" (e.g., hours or days ago)       " "yesterday  6. ITCHING: \"Does it itch?\" If Yes, ask: \"How bad is the itch?\"  (e.g., none, mild, moderate, severe)      yes  7. RECURRENT PROBLEM: \"Have you had hives before?\" If Yes, ask: \"When was the last time?\" and \"What happened that time?\"       no  8. TRIGGERS: \"Were you exposed to any new food, plant, cosmetic product or animal just before the hives began?\"      Nothing different  9. OTHER SYMPTOMS: \"Do you have any other symptoms?\" (e.g., fever, tongue swelling, difficulty breathing, abdomen pain)      99.9, swollen lips today and hands swollen  10. PREGNANCY: \"Is there any chance you are pregnant?\" \"When was your last menstrual period?\"        NA    Protocols used: Hives-A-OH    Marisa Meyer RN, BSN  [facility]    "

## 2025-06-11 ENCOUNTER — OFFICE VISIT (OUTPATIENT)
Dept: FAMILY MEDICINE | Facility: CLINIC | Age: 20
End: 2025-06-11
Payer: COMMERCIAL

## 2025-06-11 VITALS
HEIGHT: 70 IN | HEART RATE: 78 BPM | WEIGHT: 199.5 LBS | OXYGEN SATURATION: 98 % | SYSTOLIC BLOOD PRESSURE: 110 MMHG | BODY MASS INDEX: 28.56 KG/M2 | DIASTOLIC BLOOD PRESSURE: 64 MMHG | RESPIRATION RATE: 16 BRPM | TEMPERATURE: 98.8 F

## 2025-06-11 DIAGNOSIS — L50.9 HIVES: ICD-10-CM

## 2025-06-11 DIAGNOSIS — F41.1 GAD (GENERALIZED ANXIETY DISORDER): Primary | ICD-10-CM

## 2025-06-11 DIAGNOSIS — R53.83 TIRED: ICD-10-CM

## 2025-06-11 DIAGNOSIS — F33.0 MILD EPISODE OF RECURRENT MAJOR DEPRESSIVE DISORDER: ICD-10-CM

## 2025-06-11 PROCEDURE — 84443 ASSAY THYROID STIM HORMONE: CPT | Performed by: INTERNAL MEDICINE

## 2025-06-11 PROCEDURE — 36415 COLL VENOUS BLD VENIPUNCTURE: CPT | Performed by: INTERNAL MEDICINE

## 2025-06-11 PROCEDURE — 99214 OFFICE O/P EST MOD 30 MIN: CPT | Performed by: INTERNAL MEDICINE

## 2025-06-11 RX ORDER — ESCITALOPRAM OXALATE 20 MG/1
20 TABLET ORAL DAILY
Qty: 90 TABLET | Refills: 0 | Status: SHIPPED | OUTPATIENT
Start: 2025-06-11 | End: 2025-06-11

## 2025-06-11 RX ORDER — ESCITALOPRAM OXALATE 20 MG/1
20 TABLET ORAL DAILY
Qty: 90 TABLET | Refills: 1 | Status: SHIPPED | OUTPATIENT
Start: 2025-06-11

## 2025-06-11 RX ORDER — PREDNISONE 10 MG/1
TABLET ORAL
COMMUNITY
Start: 2025-06-09

## 2025-06-11 RX ORDER — PREDNISONE 20 MG/1
TABLET ORAL
Qty: 20 TABLET | Refills: 0 | Status: SHIPPED | OUTPATIENT
Start: 2025-06-11

## 2025-06-11 ASSESSMENT — PAIN SCALES - GENERAL: PAINLEVEL_OUTOF10: NO PAIN (0)

## 2025-06-11 NOTE — PROGRESS NOTES
Assessment & Plan     CE (generalized anxiety disorder)  His anxiety is better after stopping Wellbutrin  Increase the dose of Lexapro to 20 mg    Mild episode of recurrent major depressive disorder  Anxiety is better after stopping Wellbutrin and mental health is also improving we will increase the dose of Lexapro to 20 mg    Hives  He went to some grand party on Sunday and had Tacos and other food but nothing unusual  On Sunday night he started having itching  By early Monday morning he had hives on his body with some swollen lips which came on later  He was having intense itching  Took some Benadryl   He went to urgent care where he was prescribed some prednisone  He was given 40 mg and 1 last dose taper 10 mg by every day  His hives have improved remarkably  Explained to him that if this happens next time we can straightaway started prednisone tapering dose but we also need to get allergy referral but I strongly believe that this will probably not happen again and this is caused by some things that he ate on Sunday   - predniSONE (DELTASONE) 20 MG tablet; Take 3 tabs by mouth daily x 3 days, then 2 tabs daily x 3 days, then 1 tab daily x 3 days, then 1/2 tab daily x 3 days.  - escitalopram (LEXAPRO) 20 MG tablet; Take 1 tablet (20 mg) by mouth daily.    Tired  Check TSH  - TSH with free T4 reflex                Xavi Day is a 20 year old, presenting for the following health issues:  Derm Problem (Lips swollen yesterday, puffy hands/Hives   prescribed Prednisone from [facility] )        6/11/2025     1:52 PM   Additional Questions   Roomed by Merari   Accompanied by mom (Milka Sharath)     History of Present Illness       Reason for visit:  Allergic reaction with edema and hives, unknown source  Symptom onset:  1-3 days ago  Symptoms include:  Hives, swelling, itchy  Symptom intensity:  Severe  Symptom progression:  Improving  Had these symptoms before:  No  What makes it better:   "Steroids/antihistamines   He is taking medications regularly.                  Review of Systems  Constitutional, HEENT, cardiovascular, pulmonary, gi and gu systems are negative, except as otherwise noted.      Objective    /64 (BP Location: Right arm, Patient Position: Sitting, Cuff Size: Adult Large)   Pulse 78   Temp 98.8  F (37.1  C) (Temporal)   Resp 16   Ht 1.783 m (5' 10.2\")   Wt 90.5 kg (199 lb 8 oz)   SpO2 98%   BMI 28.46 kg/m    Body mass index is 28.46 kg/m .  Physical Exam   GENERAL: alert and no distress  EYES: Eyes grossly normal to inspection, PERRL and conjunctivae and sclerae normal  HENT: ear canals and TM's normal, nose and mouth without ulcers or lesions  NECK: no adenopathy, no asymmetry, masses, or scars  RESP: lungs clear to auscultation - no rales, rhonchi or wheezes  CV: regular rate and rhythm, normal S1 S2, no S3 or S4, no murmur, click or rub, no peripheral edema  ABDOMEN: soft, nontender, no hepatosplenomegaly, no masses and bowel sounds normal  MS: no gross musculoskeletal defects noted, no edema  SKIN: no suspicious lesions or rashes  NEURO: Normal strength and tone, mentation intact and speech normal  PSYCH: mentation appears normal, affect normal/bright            Signed Electronically by: Rajeev Kamara MD    "

## 2025-06-12 ENCOUNTER — RESULTS FOLLOW-UP (OUTPATIENT)
Dept: FAMILY MEDICINE | Facility: CLINIC | Age: 20
End: 2025-06-12

## 2025-06-12 LAB — TSH SERPL DL<=0.005 MIU/L-ACNC: 1.44 UIU/ML (ref 0.3–4.2)

## 2025-06-26 ENCOUNTER — PSYCHE (OUTPATIENT)
Facility: CLINIC | Age: 20
End: 2025-06-26
Payer: COMMERCIAL

## 2025-06-26 DIAGNOSIS — Z13.39 ADHD (ATTENTION DEFICIT HYPERACTIVITY DISORDER) EVALUATION: ICD-10-CM

## 2025-06-26 DIAGNOSIS — F32.0 MAJOR DEPRESSIVE DISORDER, SINGLE EPISODE, MILD: ICD-10-CM

## 2025-06-26 DIAGNOSIS — F41.1 GENERALIZED ANXIETY DISORDER: Primary | ICD-10-CM

## 2025-06-26 NOTE — PROGRESS NOTES
[facility]   Mental Health & Addiction Services     Progress Note    Patient Name: Shay Early  Date: 2025       Service Type:  Individual      Session Start Time: 2:30pm  Session End Time:  4:04pm     Session Length: 94 min    Session #: 3    Attendees: Client attended alone    Service Modality:  In-person      DATA  Interactive Complexity: No  Crisis: No       Progress Since Last Session (Related to Symptoms / Goals / Homework):   Symptoms: No change      Homework: Patient completed Laura Questionnaires prior to in person testing appointment.      Episode of Care Goals: N/A - no ongoing care provided.      Current / Ongoing Stressors and Concerns:   Patient presented to session to initiate ADHD evaluation process. Patient endorsed symptoms consistent with ADHD, depression, anxiety, and panic.     Treatment Objective(s) Addressed in This Session:   Completing cognitive testing to measure reported problems with ADHD.     Intervention:   Began session by orienting patient to testing process. Patient had the opportunity to ask questions and express concerns. Patient indicated that he understood and consented to continue. Completed WAIS-5 and MMPI-3. No breaks were utilized throughout the testing process. Ended by discussing and scheduling feedback session. See time breakdown:     Orientation to testing process: 2:30pm-2:32pm (2min)  WAIS-5: 2:32pm-3:30 (58min)  MMPI-3: 3:30pm-4:01pm (31min)  Wrap-up and feedback schedulin:01pm-4:04pm (3min)    Assessments completed prior to visit:  The following assessments were completed by patient for this visit:  PHQ9:       2025     1:50 PM   PHQ-9 SCORE   PHQ-9 Total Score MyChart 9 (Mild depression)   PHQ-9 Total Score 9        Patient-reported     GAD7:       2/10/2025     1:02 PM 2025     2:07 PM   CE-7 SCORE   Total Score 7 (mild anxiety) 8 (mild anxiety)   Total Score 7  8        Patient-reported    Proxy-reported      PROMIS 10-Global Health (only subscores and total score):       5/12/2025     2:08 PM   PROMIS-10 Scores Only   Global Mental Health Score 11    Global Physical Health Score 16    PROMIS TOTAL - SUBSCORES 27        Patient-reported        ASSESSMENT: Current Emotional / Mental Status (status of significant symptoms):   Risk status (Self / Other harm or suicidal ideation)   Patient denies current fears or concerns for personal safety.   Patient denies current or recent suicidal ideation or behaviors.   Patient denies current or recent homicidal ideation or behaviors.   Patient denies current or recent self injurious behavior or ideation.   Patient denies other safety concerns.   Patient reports there has been no change in risk factors since their last session.     Patient reports there has been no change in protective factors since their last session.     Recommended that patient call 911 or go to the local ED should there be a change in any of these risk factors     Appearance:   Appropriate    Eye Contact:   Good    Psychomotor Behavior: Normal    Attitude:   Cooperative    Orientation:   All   Speech    Rate / Production: Normal     Volume:  Normal    Mood:    Euthymic   Affect:    Appropriate    Thought Content:  Clear    Thought Form:  Coherent  Logical    Insight:    Good     Diagnosis:  1. Generalized anxiety disorder    2. Major depressive disorder, single episode, mild    3. ADHD (attention deficit hyperactivity disorder) evaluation          PLAN:  Clinician will compile data in preparation for final feedback. Feedback session scheduled for 7/17/2025.    Jeanie Doty MA  Doctoral Psychology Intern  6/26/2025 at 4:06 PM

## 2025-07-17 ENCOUNTER — VIRTUAL VISIT (OUTPATIENT)
Facility: CLINIC | Age: 20
End: 2025-07-17
Payer: COMMERCIAL

## 2025-07-17 DIAGNOSIS — F41.1 GENERALIZED ANXIETY DISORDER: ICD-10-CM

## 2025-07-17 DIAGNOSIS — F32.0 MAJOR DEPRESSIVE DISORDER, SINGLE EPISODE, MILD: Primary | ICD-10-CM

## 2025-07-17 NOTE — PROGRESS NOTES
Patient: Shay Early  YOB: 2005  MRN: [id number]  Date(s) of assessment: 5/12/2025, 5/19/2025, and 6/26/2025  Referral Source: Rajeev Kamara MD (PCP)  Reason for Referral: assessing reported deficits in executive functioning     IDENTIFYING INFORMATION AND BRIEF HISTORY OF PRESENTING PROBLEM: Shay is a 20-year-old male who presented to the initial diagnostic intake appointments on 5/12/2025 and 5/19/2025 (see diagnostic intake dated 5/19/2025 for more detailed background information) due to primary concerns with inattention and hyperactivity/impulsivity.    As a child, patient reported that he failed to complete assigned chores in the home environment, had problems with organization and keeping track of items, and would often misplace or lose things. Patient reported forgetting school work or other items between home and school and would need frequent reminders by parents to be motivated or to complete work. Patient displayed argumentative or oppositional behaviors and had problems managing temper with frequent emotional outbursts. Patient reported difficulty with childhood peer relationships, where he would often push friends away when he was younger. Patient  reported significant behavior and discipline problems including disruptive classroom behavior and failure to finish or complete homework.      As an adult, patient reports symptoms have worsened since being in college. He reports struggling to stay focused on things and complete tasks. He indicates trouble with planning activities with friends and remembering important dates. Currently, the patient reported experiencing the following symptoms:  Making careless mistakes/not paying attention to details (rushing through things and will make mistakes)  Difficulty sustaining attention (mind drifts frequently)  Does not listen when spoken to directly   Does not follow through with tasks (would turn things in late)  Difficulty organizing (does  not have a system to stay organized)  Avoids/dislikes tasks that require sustained mental effort (avoids assignments)  Loses things often (phone, wallet, keys)  Is easily distracted   Is forgetful   Is fidgety    Mental Health History: The patient reported a history of anxiety. He indicated symptoms beginning at around age 11 or 12. Patient indicated current depressive symptoms. He denied a history of manic symptoms, social anxiety, phobic responses, symptoms of obsessive-compulsive disorder, trauma, and perceptual difficulties. The patient denied issues with sexual compulsivity, gambling, and disordered eating. Patient has received individual therapy in the past. He denied a history of psychiatric hospitalizations and civil commitment.    Developmental History: The patient reported that he believes that he is the product of a full-term pregnancy and there were no complications during his mother's pregnancy or birth. The patient reported that he believes he met all of his developmental milestones on time. Patient reported his twin brother being diagnosed with Autism Spectrum Disorder. Along with his brother, the patient was placed in special needs programming in elementary/middle school. He reports being on an IEP from middle school to early high school. He denied tutoring services and a history of head injuries. The patient recalled academic weaknesses in math and writing.     Chemical Dependence/Substance Abuse History: The patient denied a history of chemical or substance abuse. He reported rarely drinking, only within social situations. He denied nicotinue use, street drug use, and prescription drug misuse. The patient reported occasional cannabis use, typically a couple of times out of the month. He reported daily caffeine use, typically coffee, soda, or tea.     SOURCES OF DATA/ASSESSMENT: Review of medical and psychiatric records, consideration of behavioral observations during the testing (if applicable),  and the results of the psychological tests are all considered in the preparation of this psychological test report. It is important to note that test results comprise a hypothesis of the patient's mental health concerns and are not an independent or conclusive assessment. Test results are combined with the patient's available medical, psychological, behavioral data for an integrated interpretation and report.     TESTS ADMINISTERED:  Wechsler Adult Intelligence Scale - 5th Edition (WAIS-5)   Laura Adult ADHD Rating Scale-IV: Self and Other Reports (BAARS-IV)  [facility] Functional Impairment Scale: Self and Other Reports (BFIS)  Laura Deficits in Executive Functioning Scale (BDEFS)  Generalized Anxiety Disorder Questionnaire (CE-7)  Patient Health Questionnaire- 9 (PHQ-9)  [place] Multiphasic Personality Inventory - 3 (MMPI - 3)     BEHAVIORAL OBSERVATIONS: The patient was pleasant and cooperative throughout all interview and explanation of testing process. The patient was oriented to person, place, and time. Mood was neutral. Eye contact was adequate and speech was at normal rate and rhythm. Motor activity was appropriate. The patient was able to maintain an environment conducive to testing.      TEST RESULTS: Test results comprise a hypothesis of the patient's mental health concerns and are not an independent or conclusive assessment. Test results are combined with the patient's available medical, psychological, behavioral, and observational data for an integrated interpretation and report.    The Wechsler Adult Intelligence Scale 5th Edition   The WAIS-5 is an individually administered instrument designed to assess the intellectual abilities of examinees. It provides scores that represent intellectual functioning in five specific cognitive domains: Verbal Comprehension, Visual Spatial, Fluid Reasoning, Working Memory, and Processing Speed. Additional ancillary domains are used measure capabilities more  narrowly.     Primary Index Standard Score Percentile Rank Descriptive Category   Verbal Comprehension (VCI) 119 90 Above Average   Visual Spatial (VSI) 103 58 Average   Fluid Reasoning (FRI) 105 63 Average   Working Memory (WMI) 100 50 Average   Processing Speed (PSI) 97 42 Average   Full Scale (FSIQ) 111 77 Above Average     Ancillary Index Standard Score Percentile Rank Descriptive Category   Auditory Working Memory- Manipulation 104 61 Average   General Ability (GAI) 111 77 Above Average     Subtests Scaled Score Percentile Rank   VCI Subtests     Similarities 15 95   Vocabulary 12 75   VSI Subtests     Block Design  10 50   Visual Puzzles 11 63   FRI Subtests     Matrix Reasoning  9 37   Figure Weights 13 84   WMI Subtests     Digit Sequencing 13 84   Running Digits  7 16   PSI Subtests     Coding 10 50   Symbol Search 9 37   AWM-M Subtests     Digits Backwards 9 37   Letter-Number Sequencing 10 50     Test data indicate there is no significant discrepancy between the patient's verbal and nonverbal reasoning and problem solving abilities; therefore, the FSIQ (SS = 111, 77th percentile), which falls in the descriptive category range, is an accurate representation of the patient's overall intellectual abilities.      The VCI is a measure of verbal concept formation, verbal reasoning, and knowledge acquired from one's environment. Test data indicate that the patient's verbal abilities are in the descriptive category range (VCI SS = 119, 90th percentile).      The VSI is a measure of spatial processing and visual-motor integration. The patient's overall ability to use this component of nonverbal reasoning skills is in the descriptive category range (VSI SS = 103, 58th percentile).     The FRI is a measure of perceptual and fluid reasoning. The patient's overall ability to use this component of nonverbal reasoning skills is in the descriptive category range (FRI SS = 105, 63rd percentile).     The WMI provides a  "measure of the examinee's working memory abilities. Working memory tasks require the ability to temporarily retain information in memory, perform some mental operation on, or manipulation of, it and produce a result. It involves attention, concentration, and mental control. Test data indicate the patient's overall working memory abilities are in the descriptive category range (WMI SS = 100, 50th percentile).     The PSI provides a measure of the examinee's ability to quickly and correctly scan, sequence, or discriminate simple visual information. This composite also measures short-term visual memory, attention, and visual-motor coordination. Test data indicate the patient's overall processing abilities are in the descriptive category range (PSI SS = 97, 42nd percentile).     Finally, the AWM-M measures mental manipulation and the ability to simultaneously register, maintain, and transform information. Test data indicate the patient's overall auditory working memory abilities are in the descriptive category range (AWM-M SS = 104, 61st percentile).     Laura Adult ADHD Rating Scale-IV: Self and Other Reports (BAARS-IV)  The BAARS-IV assesses for symptoms of ADHD that are experienced in one's daily life. This assessment measure includes self and collateral rating scales designed to provide information regarding current and childhood symptoms of ADHD including inattention, hyperactivity, and impulsivity. Self-report scores are reported as percentiles. Scores at the 76th-83rd percentile are considered marginal, scores at the 84th-92nd percentile are considered borderline, scores at the 93rd-95th percentile are considered mild, scores at the 96th-98th percentile are considered moderate, and those at the 99th percentile are considered severe. Collateral or \"other\" rating scales are reported as number of symptoms observed in comparison to those reported by the client. Norms and percentile scores are not available for " collateral reports.     Current Symptoms Scale--Self Report:   Client completed the self-report inventory of current symptoms. The results indicate that the client's Total ADHD Score was 42 which places the patient in the 94th percentile for overall ADHD symptoms. In addition, the client endorsed 7/9 (98th percentile) Inattention symptoms, 0/9 (1-75th percentile) Hyperactivity-Impulsivity symptoms, and 4/9 (93rd percentile) Sluggish Cognitive Tempo symptoms. Client indicated that the reported symptoms have resulted in impaired functioning in school, home, work, and social relationships. Overall, the results suggest the client is experiencing mild ADHD symptoms.     Current Symptoms Scale--Other Report:  Client's mother completed the collateral report inventory of current symptoms. Based on the collateral contact's observation of symptoms, the client demonstrates 7/9 Inattention symptoms, 1/5 Hyperactivity symptoms, 1/4 Impulsivity symptoms, and 7/9 Sluggish Cognitive Tempo symptoms. The client's Total ADHD Score was 46. The collateral contact indicated the client demonstrates impaired functioning in school, home, work, and social relationships.  The collateral- and self-report scores are significantly different. The collateral report indicates higher symptom severity.    Childhood Symptoms Scale--Self-Report:  Client completed the self-report inventory of childhood symptoms. The results indicate that the client's Total ADHD Score was 46 which places the patient in the 97th percentile for overall ADHD symptoms in childhood. In addition, the client endorsed 8/9 (98th percentile) Inattention symptoms and 7/9 (97th percentile) Hyperactivity-Impulsivity symptoms. Client indicated that the reported symptoms resulted in impaired functioning in school, home, and social relationships. Overall, the results suggest the client experienced moderate symptoms of ADHD as a child.     Childhood Symptoms Scale--Other  "Report:  Client's mother completed the collateral report inventory of childhood symptoms. Based on the collateral contact's recollection of client's childhood symptoms, the client demonstrated 8/9 Inattention symptoms and 2/9 Hyperactivity-Impulsivity symptoms. The client's Total ADHD Score was 46. The collateral contact indicated the client demonstrates impaired functioning in school, home, and social relationships. The collateral- and self-report scores are not significantly different.                           Laura Functional Impairment Scale: Self and Other Reports (BFIS)  The BFIS is used to assess an individuals' psychosocial impairment in major life/daily activities that may be due to a mental health disorder. This assessment measure includes self and collateral rating scales. Self-report scores are reported as percentiles. Scores at the 76th-83rd percentile are considered marginal, scores at the 84th-92nd percentile are considered borderline, scores at the 93rd-95th percentile are considered mild, scores at the 96th-98th percentile are considered moderate, and those at the 99th percentile are considered severe. Collateral or \"other\" rating scales are reported as number of symptoms observed in comparison to those reported by the client. Norms and percentile scores are not available for collateral reports.     Results indicate the client identified impairment (scores at or greater than 93rd percentile) in the following areas: home-family, home-chores, education, and daily responsibilities. The client's Mean Impairment Score was 4.36 (76-84th percentile) indicating the client is reporting marginal impairment in functioning across domains. Client's mother completed the collateral rating scale, which indicated discrepant results. The collateral contact's scores were generally higher than the client's report.     Laura Deficits in Executive Functioning Scale (BDEFS)  The BDEFS is a measure used for evaluating " "dimensions of adult executive functioning in daily life. This assessment measure includes self and collateral rating scales. Self-report scores are reported as percentiles. Scores at the 76th-83rd percentile are considered marginal, scores at the 84th-92nd percentile are considered borderline, scores at the 93rd-95th percentile are considered mild, scores at the 96th-98th percentile are considered moderate, and those at the 99th percentile are considered severe. Collateral or \"other\" rating scales are reported as number of symptoms observed in comparison to those reported by the client. Norms and percentile scores are not available for collateral reports.     Results indicate the client's Total Executive Functioning Score was 260 (99+ percentile). The ADHD-Executive Functioning Index score was 31 (98th percentile). These scores suggest the client has severe deficits in executive functioning. Results indicate the client identified significant deficits in the following areas: self-management to time (moderate deficits), self-organization/problem-solving (moderate deficits), self-restraint (no significant deficits), self-motivation (severe deficits) and self-regulation of emotions (moderate deficits). Client's mother completed the collateral rating scale, which indicated discrepant results. The collateral contact's scores were generally higher than the client's report.    Generalized Anxiety Disorder Questionnaire (CE-7)  This questionnaire is designed to assess for anxiety in adults. Based on the score, the patient is experiencing moderate symptoms of anxiety. Client identified the following symptoms of anxiety: feeling on edge/nervous/anxious, difficulty controlling worry, worrying about many different things, trouble relaxing, being restless, and becoming easily annoyed or irritable.    Patient Health Questionnaire- 9 (PHQ-9)   This questionnaire is designed to assess for depression in adults. Based on the score, " the patient is experiencing mild symptoms of depression. Client identified the following symptoms of depression: depressed mood, lack of interest, difficulty with sleep, feeling tired or having little energy, feeling bad about self, poor concentration, and suicidal ideation.    [place] Multiphasic Personality Inventory - 3 (MMPI-3)    The MMPI-3 was administered to evaluate current level of emotional distress. Validity profile indicates that the patient appears to have answered in a generally straightforward and consistent manner. No items were omitted.  However, possible over-reporting is indicated by an unusual combination of responses that is associated with noncredible memory complaints. Therefore, the profile should be interpreted with caution.     Somatic/Cognitive Dysfunction: The patient reported multiple somatic complaints that may include head pain and neurological and gastrointestinal symptoms. He reported a diffuse pattern of cognitive difficulties such as challenges with attention, concentration, memory, and possible confusion.     Emotional Dysfunction: The patient endorsed a variety of items that suggests considerable emotional distress that is likely to be perceived as a crisis. He reported feeling sad and unhappy, and dissatisfied with current life circumstances. The patient indicates a lack of positive emotional experiences and significant anhedonia. He indicates negative emotional experiences such as anxiety, anger, and fear. He reports current suicidal ideation and a history of suicidal ideation and attempts.The patient indicates feeling helpless and/or hopeless as well as self-doubt and futility. He reports being passive, indecisive, and inefficacious. The patient reports an above-average level os tress and experiences excessive worry, including preoccupation with disappointments. He reports multiple anxiety-related experiences, including generalized anxiety, re-experiencing, and panic.      Thought Dysfunction: The patient's thought processes are rational and realistic.     Behavioral Dysfunction: The patient reports conflictual family relationships and a lack of support from family members. He reports behaviors and experiences associated with hypomanic activation, such as excitability, impulsivity, and elevated mood. He reports heightened excitation and energy levels. The patient indicates engaging in problematic impulsive behavior, such as nonplanful behavior and a possible history of hyperactive behavior. The patient describes others as well-intentioned and trustworthy.     Interpersonal Functioning: The patient reports being passive and submissive and not liking to be in charge. He reports not enjoying social events and avoiding social situations, including parties and other events where crowds are likely to gather. He reports being shy, easily embarrassed, and uncomfortable around others.     SUMMARY: Shay is a 20-year-old male who completed psychological testing remotely/virtually. Testing was requested to provide updated diagnostic clarification and necessary treatment recommendations.    Patient first completed a diagnostic interview in which mental health symptoms, ADHD symptoms, and background information was gathered. Patient self-reported 7 symptoms of inattention and 2 symptoms of hyperactivity/impulsivity and indicated that his abilities to function at school, home, work, and within social relationships are significantly impaired. Further, his self-reported symptoms on Laura measures of ADHD symptoms were consistent with this information. Both him and his mother reported to observe significant symptoms in him currently and as a child. An objective measure of personality indicated that the patient reported multiple somatic complaints that may include head pain and neurological and gastrointestinal symptoms. He reported challenges with attention, concentration, memory, and possible  confusion. The patient reported feeling sad and unhappy, and dissatisfied with current life circumstances. He indicates negative emotional experiences such as anxiety, anger, and fear. He reports current suicidal ideation and a history of suicidal ideation and attempts. The patient reports an above-average level os tress and experiences excessive worry, including preoccupation with disappointments. He reports multiple anxiety-related experiences, including generalized anxiety, re-experiencing, and panic. The patient's thought processes are rational and realistic. He reports conflictual family relationships and a lack of support from family members. He reports behaviors and experiences associated with hypomanic activation, such as excitability, impulsivity, and elevated mood. The patient indicates engaging in problematic impulsive behavior, such as nonplanful behavior and a possible history of hyperactive behavior. He reports being passive and submissive and not liking to be in charge. He reports not enjoying social events and is shy, easily embarrassed, and uncomfortable around others.     Intelligence testing results varied. Verbal comprehension was scored to be in the above average range. The patient was able to articulate abstract similarities between concepts and define vocabulary words significantly more effectively than peers.  Visual-spatial abilities were scored to be in the average range, as he was able to solve two- and three-dimensional puzzles significantly more effectively than peers.  Fluid reasoning represents a strength and was scored in the average range.  The patient was able to complete nonverbal problem-solving tasks significantly more effectively than peers.  Second-order cognitive capabilities are comprised of working memory, processing speed, and auditory working memory manipulation.  The patient's working memory abilities were scored in the average range. He was able to hold information in  mind for short-term manipulation more effectively than peers. Processing speed was scored in the average range. He was able to scan and respond to visual stimuli more effectively than peers. Auditory working memory manipulation was scored to be in the average range, as he was able to register auditory information for the purpose of mental manipulation more effectively than peers.       ADHD is characterized by statistically significant differences between higher-order and second-order cognitive capabilities.  Auditory working memory manipulation has been specifically implicated as being a personal and/or normative weakness in comparison to higher-order cognitive capabilities among individuals with ADHD. No considerable discrepancy was indicated amongst higher-order cognitive capabilities and the patient's auditory working memory manipulation capabilities. Symptoms are better explained by the presence of mood symptoms, rather than an organic basis of inattention. The patient reports significant depression and anxiety. The presence of depression and anxiety can cause difficulty in performing tasks that require careful attention as well as lead to poor concentration. Specifically, depression can cause significant impairments in working memory, which can make it difficult to concentrate, make decisions, and switch between tasks. Anxiety and worry can also hinder an individual's ability to focus on important tasks and remember important events. Therefore, the patient meets criteria for major depressive disorder, recurrent episode, mild, and generalized anxiety disorder. See recommendations below.     Referral Question Response: DSM-5 criteria for ADHD:   A. Symptom Count - Are there sufficient symptoms for the diagnosis? Yes, patient did endorse sufficient symptoms.   B. Onset - Were several symptoms present before 12 years of age? Yes, a significant number of symptoms reportedly began in middle school.   C.  Pervasiveness - Are several symptoms present in at least two settings? Yes, patient reported that symptoms are problematic at home, school, work, and within social relationships.   D. Impairment - Do symptoms interfere with or reduce the quality of functioning? Yes, patient is unable to complete daily tasks and coursework effectively.   E. Exclusions - Are symptoms better explained by another disorder or factor? Yes, symptoms are better explained by anxiety and depression symptoms. Difficulties are not explained by an organic basis of inattention.     The patient meets the following DSM-5 criteria for generalized anxiety disorder:  A. Excessive anxiety and worry, occurring more days than not for at least 6 months about a number of events or activities.   B. The individual finds it difficult to control the worry.  C. The anxiety and worry are associated with 3 or more of 6 symptoms.  D. The anxiety, worry, or physical symptoms cause clinically significant distress or impairment in social, occupational, or other important areas of functioning.  E. The disturbance is not attributable to the physiological effects of a substance (e.g., a drug of abuse, a medication) or another medical condition (e.g., hyperthyroidism).  F. The disturbance is not better explained by another mental disorder.    The patient also meets the following DSM-5 criteria for major depressive disorder:  A. Five (or more) symptoms have been present during the same 2-week period and represent a change from previous functioning; at least one of the symptoms is either (1) depressed mood or (2) loss of interest or pleasure.   Depressed mood.   Diminished interest or pleasure in all, or almost all, activities.   Significant appetite change.  Significant sleep change.   Fatigue or loss of energy.   Feelings of worthlessness or inappropriate guilt.   Diminished ability to think or concentrate, or indecisiveness.   Psychomotor agitation or  lethargy.  Recurrent thoughts of death.   B. The symptoms cause clinically significant distress or impairment in social, occupational, or other important areas of functioning.  C. The episode is not attributable to the physiological effects of a substance or to another medical condition.  D. The occurrence of major depressive episode is not better explained by other thought / psychotic disorders.  E. There has never been a manic episode or hypomanic episode.     DIAGNOSES:  F41.1 Generalized Anxiety Disorder  F32.0 Major Depressive Disorder, recurrent episode, mild    PLAN OF CARE:  Discuss the following with your primary care provider:  Continue the use of psychotropic medication as prescribed by PCP. This may help alleviate some of the patient's depression and anxiety symptoms.     Consider initiating individual psychotherapy to help alleviate mood symptoms. Research indicates that outcomes are best with both medication and therapy. You can call the [facility] line at [phone].     RECOMMENDATIONS:  Due to the patient's reported attention, concentration, and mood difficulties, the following health/lifestyle changes when combined, can significantly improve symptoms:   Avoid simple carbohydrates at breakfast. Aim for only complex carbohydrates and lean protein for your morning meal.   Engage in aerobic exercise 3 times per week for 30 minutes, ensuring that your heart rate stays within your training zone. Further, reading the book,  Spark,  by Agustín Allred M.D. can help the patient understand the benefits of exercise on the brain.   Research suggest that taking a high-quality multi-vitamin and antioxidant (1/2 cup of blueberries) daily in conjunction with balanced nutrition can be helpful.  Aim for the high end of daily water intake: around 72 ounces per day.  Ensure regular meals and snacks to maintain optimal attention.    The following may be beneficial in managing some of the  patient's attention and concentration difficulties:  Due to the patient's difficulties with attention and concentration, consider working in a completely distraction-free area while completing tasks. Workspaces should be completely clear except for the materials needed for the current task. Both visual and auditory distractions should be decreased as much as possible.  Considering decreased ability to focus and maintain attention, it is recommended that the patient take frequent breaks while completing tasks. This will help to maintain attention and effort. The patient may benefit from the use of a [employer] Timer. The timer works by using built-in break times. After working on a task consistently for 25 minutes, the timer reminds the user to take a five-minute break before continuing, etc. A [employer] timer can be downloaded as a free edmond to a phone or tablet.  Due to the patient's attentional and concentration symptoms, it is recommended to increase organization with the use of lists and calendars. Significantly increasing structure to the day and adhering to a set schedule can increase your ability to complete responsibilities, track deadline, etc. Breaking these tasks down into their component parts and recording them in a calendar/planner will likely be beneficial. Patient would benefit from setting feasible timelines for completion of activities. By establishing clear priorities for completing tasks, you can more likely complete the most important tasks first. The patient may also choose to elect to a friend or family member to help hold them accountable.    Avoid multitasking. Attempting to work on multiple tasks and projects the same increases the likelihood that an error will occur. Focus on one task at a time.    Due to the patient's reported difficulties with attention, it is recommended to consistently take thorough notes in classes where it is warranted. The patient may consider using a smartpen to take  "these notes. A smartpen is able to capture audio and transfer written notes into a digital document. You can learn more about smartpens at [email or web address].    Due to the patient's difficulties with sleep, it recommended to engage in a relaxing activity up to the point of sleep. The patient may want to spend time reading, drawing/ coloring, practicing mindfulness, listening (not watching) to podcasts, music, etc., or try the [employer] edmond, which is free to download and may aid falling asleep more easily.    The patient may benefit from engaging in mindfulness practices. This may include breathing techniques, apps that provide guided meditation, or more interactive activities such as coloring.    Develop a \"coping skills jar/box.\" This entails designating a certain container to hold slips of paper with distraction technique ideas written on each slip of paper. Distraction techniques may include listening to a certain type of music, playing on game on your phone, doing a breathing exercise, spending time with a pet, calling a certain individual, looking at a magazine, working on a puzzle, etc. When feeling distressed, choose a slip of paper from the container and engage in that activity rather than focusing on the problem.    Patient may need to negotiate with their employer for more frequent breaks or be allowed to move around more than is typical.  "

## 2025-07-17 NOTE — PROGRESS NOTES
[facility]   Mental Health & Addiction Services     Progress Note - ADHD Feedback Session     Patient Name: Shay Early  Date: 2025       Service Type:  Individual       Session Start Time: 4:00 pm  Session End Time:  4:33 pm     Session Length: 33 minutes    Session #: 3    Attendees: Patient attended alone    Service Modality: Video Visit:      Provider verified identity through the following two step process.  Patient provided:  Patient  and Patient address    Telemedicine Visit: The patient's condition can be safely assessed and treated via synchronous audio and visual telemedicine encounter.      Reason for Telemedicine Visit: Patient convenience (e.g. access to timely appointments / distance to available provider)    Originating Site (Patient Location): Patient's home    Distant Site (Provider Location): [facility] AND [facility]    Consent:  The patient/guardian has verbally consented to: the potential risks and benefits of telemedicine (video visit) versus in person care; bill my insurance or make self-payment for services provided; and responsibility for payment of non-covered services.     Patient would like the video invitation sent by:  My Chart    Mode of Communication:  Video Conference via [employer]    Distant Location (Provider):  On-site    As the provider I attest to compliance with applicable laws and regulations related to telemedicine.          2025     1:50 PM   PHQ   PHQ-9 Total Score 9    Q9: Thoughts of better off dead/self-harm past 2 weeks Not at all       Patient-reported           2/10/2025     1:02 PM 2025     2:07 PM   CE-7 SCORE   Total Score 7 (mild anxiety) 8 (mild anxiety)   Total Score 7  8        Patient-reported    Proxy-reported         DATA      Progress Since Last Session (Related to Symptoms / Goals / Homework):   Symptoms:  Stable.    Homework: Completed.      Treatment Objective(s) Addressed in This Session:   Provided feedback on ADHD evaluation. Reviewed test results in depth. Plan of care and recommendations were discussed based on testing data. See full report attached on secondary note in this encounter.     Intervention:   Provided feedback to patient regarding testing results, diagnoses, and treatment recommendations. Test results are not consistent with an ADHD diagnosis. Symptoms are better explained by depression and anxiety disorders. Personalized suggestions regarding symptoms were offered. Patient had the opportunity to ask questions; he expressed understanding.        ASSESSMENT: Current Emotional / Mental Status (status of significant symptoms):   Risk status (Self / Other harm or suicidal ideation)   Patient denies current fears or concerns for personal safety.   Patient reports the following current or recent suicidal ideation or behaviors: Passive ideation, specifically fleeting thoughts. Denies plan/intent.   Patient denies current or recent homicidal ideation or behaviors.   Patient denies current or recent self injurious behavior or ideation.   Patient denies other safety concerns.   Patient reports there has been no change in risk factors since their last session.     Patient reports there has been no change in protective factors since their last session.     Recommended that patient call 911 or go to the local ED should there be a change in any of these risk factors     Appearance:   Appropriate    Eye Contact:   Good    Psychomotor Behavior: Normal    Attitude:   Cooperative    Orientation:   All   Speech    Rate / Production: Normal     Volume:  Normal    Mood:    Normal   Affect:    Appropriate    Thought Content:  Clear    Thought Form:  Coherent  Logical    Insight:    Good      Medication Review:   No changes to current psychiatric medication(s)     Medication Compliance:   NA     Changes in Health  Issues:   None reported     Chemical Use Review:   Substance Use: Chemical use reviewed, no active concerns identified      Nicotine Use: No current tobacco use.      Diagnosis:  1. Major depressive disorder, single episode, mild    2. Generalized anxiety disorder        PLAN:   Recommendations are outlined in full evaluation report (attached to this encounter).   Patient indicated understanding and will contact the clinic if there are further questions.    Report routed to referring provider.    Parts of this documentation may have been completed using dictation software. Potential errors may result and are unintentional.       Jeanie oDty  Doctoral Psychology Intern       Psychological Testing Services Summary       Testing Evaluation Services Base: 64450  (first 60 mins) Add-on: 27767  (each addtl 60 mins)   Record Review and Clarify Referral Question   2:20pm-2:30pm on 5/12/2025 10 minutes   Patient Symptom Management   2:30pm-2:40pm on 5/12/2025 10 minutes   Clinical Decision Making/Battery Modification   3:05pm-3:20pm on 5/12/2025 15 minutes   Integration/Report Generation   9:00am-10:00am on 5/19/2025 ([facility])  3:00pm-3:30pm on 6/26/2025 (WAIS-5)  3:30pm-4:15pm on 6/26/2025 (MMPI-3)  2:00pm-3:00pm on 7/16/2025 (Final Report)   60 minutes  30 minutes  45 minutes  60 minutes   Interactive Feedback Session   4:00pm-4:33pm on 7/17/2025 33 minutes   Post-Service Work   5:20pm-5:35pm on 7/17/2025 15 minutes   Total Time: 278 minutes   Total Units: 1 4       Test Administration and Scoring Base: 97747  (first 30 mins) Add-on: 39122  (each addtl 30 mins)   Test Administration (Face-to-Face)  2:32pm-3:30pm on 6/26/2025 (WAIS-5)  3:30pm-4:01pm on 6/26/2025 (MMPI-3)   58 minutes  31 minutes   Scoring (Non-Face-to-Face)   3:30pm-3:35pm on 6/26/2025 (WAIS-5)   5 minutes   Total Time: 94 minutes   Total Units: 1 2       Diagnoses:   1. Major depressive disorder, single episode, mild    2. Generalized anxiety disorder